# Patient Record
Sex: FEMALE | Race: AMERICAN INDIAN OR ALASKA NATIVE
[De-identification: names, ages, dates, MRNs, and addresses within clinical notes are randomized per-mention and may not be internally consistent; named-entity substitution may affect disease eponyms.]

---

## 2021-04-20 ENCOUNTER — HOSPITAL ENCOUNTER (EMERGENCY)
Dept: HOSPITAL 5 - ED | Age: 36
LOS: 2 days | Discharge: HOME | End: 2021-04-22
Payer: SELF-PAY

## 2021-04-20 DIAGNOSIS — Z79.899: ICD-10-CM

## 2021-04-20 DIAGNOSIS — Z20.822: ICD-10-CM

## 2021-04-20 DIAGNOSIS — F17.200: ICD-10-CM

## 2021-04-20 DIAGNOSIS — E11.9: ICD-10-CM

## 2021-04-20 DIAGNOSIS — F31.9: Primary | ICD-10-CM

## 2021-04-20 PROCEDURE — 36415 COLL VENOUS BLD VENIPUNCTURE: CPT

## 2021-04-20 PROCEDURE — 80048 BASIC METABOLIC PNL TOTAL CA: CPT

## 2021-04-20 PROCEDURE — 80320 DRUG SCREEN QUANTALCOHOLS: CPT

## 2021-04-20 PROCEDURE — 81001 URINALYSIS AUTO W/SCOPE: CPT

## 2021-04-20 PROCEDURE — 96376 TX/PRO/DX INJ SAME DRUG ADON: CPT

## 2021-04-20 PROCEDURE — 99284 EMERGENCY DEPT VISIT MOD MDM: CPT

## 2021-04-20 PROCEDURE — 82962 GLUCOSE BLOOD TEST: CPT

## 2021-04-20 PROCEDURE — 96374 THER/PROPH/DIAG INJ IV PUSH: CPT

## 2021-04-20 PROCEDURE — 80307 DRUG TEST PRSMV CHEM ANLYZR: CPT

## 2021-04-20 PROCEDURE — U0003 INFECTIOUS AGENT DETECTION BY NUCLEIC ACID (DNA OR RNA); SEVERE ACUTE RESPIRATORY SYNDROME CORONAVIRUS 2 (SARS-COV-2) (CORONAVIRUS DISEASE [COVID-19]), AMPLIFIED PROBE TECHNIQUE, MAKING USE OF HIGH THROUGHPUT TECHNOLOGIES AS DESCRIBED BY CMS-2020-01-R: HCPCS

## 2021-04-20 PROCEDURE — G0480 DRUG TEST DEF 1-7 CLASSES: HCPCS

## 2021-04-20 PROCEDURE — 85025 COMPLETE CBC W/AUTO DIFF WBC: CPT

## 2021-04-20 PROCEDURE — 96361 HYDRATE IV INFUSION ADD-ON: CPT

## 2021-04-21 VITALS — DIASTOLIC BLOOD PRESSURE: 81 MMHG | SYSTOLIC BLOOD PRESSURE: 123 MMHG

## 2021-04-21 LAB
BASOPHILS # (AUTO): 0 K/MM3 (ref 0–0.1)
BASOPHILS NFR BLD AUTO: 0.6 % (ref 0–1.8)
BILIRUB UR QL STRIP: (no result)
BLOOD UR QL VISUAL: (no result)
BUN SERPL-MCNC: 12 MG/DL (ref 7–17)
BUN/CREAT SERPL: 15 %
CALCIUM SERPL-MCNC: 9.6 MG/DL (ref 8.4–10.2)
EOSINOPHIL # BLD AUTO: 0 K/MM3 (ref 0–0.4)
EOSINOPHIL NFR BLD AUTO: 0.1 % (ref 0–4.3)
HCT VFR BLD CALC: 41.4 % (ref 30.3–42.9)
HEMOLYSIS INDEX: 8
HGB BLD-MCNC: 14.1 GM/DL (ref 10.1–14.3)
LYMPHOCYTES # BLD AUTO: 1.3 K/MM3 (ref 1.2–5.4)
LYMPHOCYTES NFR BLD AUTO: 18.7 % (ref 13.4–35)
MCHC RBC AUTO-ENTMCNC: 34 % (ref 30–34)
MCV RBC AUTO: 90 FL (ref 79–97)
MONOCYTES # (AUTO): 0.3 K/MM3 (ref 0–0.8)
MONOCYTES % (AUTO): 4.7 % (ref 0–7.3)
MUCOUS THREADS #/AREA URNS HPF: (no result) /HPF
PH UR STRIP: 5 [PH] (ref 5–7)
PLATELET # BLD: 220 K/MM3 (ref 140–440)
PROT UR STRIP-MCNC: (no result) MG/DL
RBC # BLD AUTO: 4.59 M/MM3 (ref 3.65–5.03)
RBC #/AREA URNS HPF: 1 /HPF (ref 0–6)
UROBILINOGEN UR-MCNC: < 2 MG/DL (ref ?–2)
WBC #/AREA URNS HPF: 5 /HPF (ref 0–6)

## 2021-04-21 RX ADMIN — ACETAMINOPHEN ONE: 500 TABLET ORAL at 19:33

## 2021-04-21 RX ADMIN — ACETAMINOPHEN ONE MG: 500 TABLET ORAL at 16:55

## 2021-04-21 RX ADMIN — VALPROIC ACID SCH MG: 250 CAPSULE ORAL at 15:14

## 2021-04-21 RX ADMIN — VALPROIC ACID SCH: 250 CAPSULE ORAL at 22:12

## 2021-04-21 RX ADMIN — HALOPERIDOL SCH MG: 2 TABLET ORAL at 15:15

## 2021-04-21 RX ADMIN — HALOPERIDOL SCH: 2 TABLET ORAL at 22:12

## 2021-04-21 RX ADMIN — ACETAMINOPHEN ONE: 500 TABLET ORAL at 15:20

## 2021-04-21 RX ADMIN — INSULIN LISPRO SCH: 100 INJECTION, SOLUTION INTRAVENOUS; SUBCUTANEOUS at 19:34

## 2021-04-21 RX ADMIN — INSULIN LISPRO SCH: 100 INJECTION, SOLUTION INTRAVENOUS; SUBCUTANEOUS at 16:56

## 2021-04-21 NOTE — EVENT NOTE
S: "I have a little bit of a headache.  May I have some Tylenol?"





O: NAD, stable vital signs, recent 





A:  SI, IDDM, tension HA





P: order home insulin regimen consider  SSI, awaiting MH consultation

## 2021-04-21 NOTE — EMERGENCY DEPARTMENT REPORT
HPI





- HPI


HPI: 





This is a 35-year-old -American female who presents to the emergency 

department with a complaint of depression and suicidal ideations, with a plan to

stab herself.  Patient says that she went to the store to buy cigarettes and 

soda and became severely depressed when she did not have the money to buy these 

items.  She says that she has been without any money over the past 3 weeks.  She

has a history of bipolar disorder for which she says she is compliant with 

Haldol and Depakote.  She denies any current hallucinations or any homicidal 

ideations.  She has a past medical history of insulin-dependent diabetes.  She 

denies any fever, headache, chest pain, shortness of breath, lower extremity s

welling, nausea, vomiting.  She denies any current alcohol intoxication or any 

illicit drug use.





<ELENA LEMON - Last Filed: 04/21/21 15:41>





<DAISY CORTÉS - Last Filed: 04/22/21 11:46>





- General


Chief Complaint: Psych


Time Seen by Provider: 04/21/21 06:02





ED Past Medical Hx





- Past Medical History


Hx Diabetes: Yes


Hx Psychiatric Treatment: Yes





- Surgical History


Past Surgical History?: No





- Social History


Smoking Status: Current Every Day Smoker


Substance Use Type: None





<ELENA LEMON - Last Filed: 04/21/21 15:41>





ED Review of Systems


ROS: 


Stated complaint: MENTAL HEALTH


Other details as noted in HPI





Comment: All other systems reviewed and negative


Constitutional: denies: chills, fever


Eyes: denies: eye pain, vision change


Respiratory: denies: cough, shortness of breath


Cardiovascular: denies: chest pain, palpitations


Gastrointestinal: denies: abdominal pain, vomiting


Musculoskeletal: denies: back pain, arthralgia


Skin: denies: rash, lesions


Neurological: denies: headache, weakness


Psychiatric: depression, suicidal thoughts.  denies: auditory hallucinations, 

visual hallucinations, homicidal thoughts





<ELENA LEMON - Last Filed: 04/21/21 15:41>


ROS: 


Stated complaint: MENTAL HEALTH


Other details as noted in HPI








<DAISY CORTÉS - Last Filed: 04/22/21 11:46>





Physical Exam





- Physical Exam


Vital Signs: 


                                   Vital Signs











  04/20/21 04/21/21





  18:55 06:00


 


Temperature 98.3 F 98.2 F


 


Pulse Rate  81


 


Respiratory 20 18





Rate  


 


Blood Pressure 125/90 


 


Blood Pressure  123/81





[Left]  


 


O2 Sat by Pulse  100





Oximetry  











Physical Exam: 





GENERAL: The patient is well-developed well-nourished.


HENT: Normocephalic.  Atraumatic.    Patient has moist mucous membranes.


EYES: Extraocular motions are intact.  


NECK: Supple. Trachea is midline.


CHEST/LUNGS: Clear to auscultation.  There is no respiratory distress noted.


HEART/CARDIOVASCULAR: Regular.  There is no tachycardia.  There is no murmur.


ABDOMEN: Abdomen is soft, nontender.  Patient has normal bowel sounds.  There is

 no abdominal distention.


SKIN: Skin is warm and dry. 


NEURO: The patient is awake, alert, and cooperative.  The patient has no focal 

neurologic deficits.  Normal speech.


MUSCULOSKELETAL: There is no tenderness or deformity.  There is no limitation 

range of motion. 


PSYCH: Patient has a flat affect.





<ELENA LEMON - Last Filed: 04/21/21 15:41>





- Physical Exam


Vital Signs: 


                                   Vital Signs











  04/20/21 04/21/21 04/21/21





  18:55 06:00 23:08


 


Temperature 98.3 F 98.2 F 


 


Pulse Rate  81 


 


Respiratory 20 18 18





Rate   


 


Blood Pressure 125/90  


 


Blood Pressure  123/81 





[Left]   


 


O2 Sat by Pulse  100 





Oximetry   














<DAISY CORTÉS - Last Filed: 04/22/21 11:46>





ED Course


                                   Vital Signs











  04/20/21 04/21/21





  18:55 06:00


 


Temperature 98.3 F 98.2 F


 


Pulse Rate  81


 


Respiratory 20 18





Rate  


 


Blood Pressure 125/90 


 


Blood Pressure  123/81





[Left]  


 


O2 Sat by Pulse  100





Oximetry  














<ELENA LEMON - Last Filed: 04/21/21 15:41>


                                   Vital Signs











  04/20/21 04/21/21 04/21/21





  18:55 06:00 23:08


 


Temperature 98.3 F 98.2 F 


 


Pulse Rate  81 


 


Respiratory 20 18 18





Rate   


 


Blood Pressure 125/90  


 


Blood Pressure  123/81 





[Left]   


 


O2 Sat by Pulse  100 





Oximetry   














<DAISY CORTÉS - Last Filed: 04/22/21 11:46>





ED Medical Decision Making





- Lab Data


Result diagrams: 


                                 04/21/21 06:09





                                 04/21/21 06:09





                                   Lab Results











  04/21/21 04/21/21 04/21/21 Range/Units





  06:09 06:09 06:09 


 


WBC  7.1    (4.5-11.0)  K/mm3


 


RBC  4.59    (3.65-5.03)  M/mm3


 


Hgb  14.1    (10.1-14.3)  gm/dl


 


Hct  41.4    (30.3-42.9)  %


 


MCV  90    (79-97)  fl


 


MCH  31    (28-32)  pg


 


MCHC  34    (30-34)  %


 


RDW  12.9 L    (13.2-15.2)  %


 


Plt Count  220    (140-440)  K/mm3


 


Lymph % (Auto)  18.7    (13.4-35.0)  %


 


Mono % (Auto)  4.7    (0.0-7.3)  %


 


Eos % (Auto)  0.1    (0.0-4.3)  %


 


Baso % (Auto)  0.6    (0.0-1.8)  %


 


Lymph # (Auto)  1.3    (1.2-5.4)  K/mm3


 


Mono # (Auto)  0.3    (0.0-0.8)  K/mm3


 


Eos # (Auto)  0.0    (0.0-0.4)  K/mm3


 


Baso # (Auto)  0.0    (0.0-0.1)  K/mm3


 


Seg Neutrophils %  75.9 H    (40.0-70.0)  %


 


Seg Neutrophils #  5.4    (1.8-7.7)  K/mm3


 


Sodium   132 L   (137-145)  mmol/L


 


Potassium   4.1   (3.6-5.0)  mmol/L


 


Chloride   93.7 L   ()  mmol/L


 


Carbon Dioxide   26   (22-30)  mmol/L


 


Anion Gap   16   mmol/L


 


BUN   12   (7-17)  mg/dL


 


Creatinine   0.8   (0.6-1.2)  mg/dL


 


Estimated GFR   > 60   ml/min


 


BUN/Creatinine Ratio   15   %


 


Glucose   446 H   ()  mg/dL


 


POC Glucose     ()  mg/dL


 


Calcium   9.6   (8.4-10.2)  mg/dL


 


Plasma/Serum Alcohol    < 0.01  (0-0.07)  %


 


Coronavirus (PCR)     (Negative)  














  04/21/21 04/21/21 04/21/21 Range/Units





  07:32 08:36 10:44 


 


WBC     (4.5-11.0)  K/mm3


 


RBC     (3.65-5.03)  M/mm3


 


Hgb     (10.1-14.3)  gm/dl


 


Hct     (30.3-42.9)  %


 


MCV     (79-97)  fl


 


MCH     (28-32)  pg


 


MCHC     (30-34)  %


 


RDW     (13.2-15.2)  %


 


Plt Count     (140-440)  K/mm3


 


Lymph % (Auto)     (13.4-35.0)  %


 


Mono % (Auto)     (0.0-7.3)  %


 


Eos % (Auto)     (0.0-4.3)  %


 


Baso % (Auto)     (0.0-1.8)  %


 


Lymph # (Auto)     (1.2-5.4)  K/mm3


 


Mono # (Auto)     (0.0-0.8)  K/mm3


 


Eos # (Auto)     (0.0-0.4)  K/mm3


 


Baso # (Auto)     (0.0-0.1)  K/mm3


 


Seg Neutrophils %     (40.0-70.0)  %


 


Seg Neutrophils #     (1.8-7.7)  K/mm3


 


Sodium     (137-145)  mmol/L


 


Potassium     (3.6-5.0)  mmol/L


 


Chloride     ()  mmol/L


 


Carbon Dioxide     (22-30)  mmol/L


 


Anion Gap     mmol/L


 


BUN     (7-17)  mg/dL


 


Creatinine     (0.6-1.2)  mg/dL


 


Estimated GFR     ml/min


 


BUN/Creatinine Ratio     %


 


Glucose     ()  mg/dL


 


POC Glucose  353 H   342 H  ()  mg/dL


 


Calcium     (8.4-10.2)  mg/dL


 


Plasma/Serum Alcohol     (0-0.07)  %


 


Coronavirus (PCR)   Negative   (Negative)  














  04/21/21 Range/Units





  12:27 


 


WBC   (4.5-11.0)  K/mm3


 


RBC   (3.65-5.03)  M/mm3


 


Hgb   (10.1-14.3)  gm/dl


 


Hct   (30.3-42.9)  %


 


MCV   (79-97)  fl


 


MCH   (28-32)  pg


 


MCHC   (30-34)  %


 


RDW   (13.2-15.2)  %


 


Plt Count   (140-440)  K/mm3


 


Lymph % (Auto)   (13.4-35.0)  %


 


Mono % (Auto)   (0.0-7.3)  %


 


Eos % (Auto)   (0.0-4.3)  %


 


Baso % (Auto)   (0.0-1.8)  %


 


Lymph # (Auto)   (1.2-5.4)  K/mm3


 


Mono # (Auto)   (0.0-0.8)  K/mm3


 


Eos # (Auto)   (0.0-0.4)  K/mm3


 


Baso # (Auto)   (0.0-0.1)  K/mm3


 


Seg Neutrophils %   (40.0-70.0)  %


 


Seg Neutrophils #   (1.8-7.7)  K/mm3


 


Sodium   (137-145)  mmol/L


 


Potassium   (3.6-5.0)  mmol/L


 


Chloride   ()  mmol/L


 


Carbon Dioxide   (22-30)  mmol/L


 


Anion Gap   mmol/L


 


BUN   (7-17)  mg/dL


 


Creatinine   (0.6-1.2)  mg/dL


 


Estimated GFR   ml/min


 


BUN/Creatinine Ratio   %


 


Glucose   ()  mg/dL


 


POC Glucose  214 H  ()  mg/dL


 


Calcium   (8.4-10.2)  mg/dL


 


Plasma/Serum Alcohol   (0-0.07)  %


 


Coronavirus (PCR)   (Negative)  














- Medical Decision Making





This patient presents to the emergency department with complaint of depression 

and suicidal ideations with a plan to stab herself after she was unable to buy 

herself cigarettes and soda.  For this reason the patient has been made a 1013 

and placed on an ED hold.  She was later seen by the psychiatric assessment team

 who agrees with the plan for inpatient stabilization.





The patient's labs showed hyperglycemia with an initial blood sugar of 446 on 

serum blood draw.  On point-of-care testing the blood sugar was 353 prior to 

receiving any IV fluid or insulin.  She was given the fluid and insulin and on 

recheck the blood sugar had only gone down about 10-20 points but it turns out 

that the patient received a breakfast tray around this time.  She was given 

another dose of IV fluid and IV insulin and her blood sugar eventually came down

 to a more reasonable level at about 215.  No signs of diabetic ketoacidosis.  

Blood alcohol level negative.  We are waiting for a urine sample for urinalysis 

and UDS.





Vital signs stable throughout her ED course thus far.





The patient is medically cleared for psychiatric placement.











<ELENA LEMON - Last Filed: 04/21/21 15:41>





- Lab Data


Result diagrams: 


                                 04/21/21 06:09





                                 04/21/21 06:09





- Medical Decision Making





1013 rescinded by mental health team.  Discharged home.





<DAISY CORTÉS - Last Filed: 04/22/21 11:46>


Critical Care Time: No


Critical care attestation.: 


If time is entered above; I have spent that time in minutes in the direct care 

of this critically ill patient, excluding procedure time.








<ELENA LEMON - Last Filed: 04/21/21 15:41>


Critical care attestation.: 


If time is entered above; I have spent that time in minutes in the direct care 

of this critically ill patient, excluding procedure time.








<DAISY CORTÉS - Last Filed: 04/22/21 11:46>





ED Disposition


Is pt being admited?: No


Time of Disposition: 15:45





<ELENA LEMON - Last Filed: 04/21/21 15:41>


Is pt being admited?: No


Does the pt Need Aspirin: No





<DAISY CORTÉS - Last Filed: 04/22/21 11:46>


Clinical Impression: 


 Bipolar 1 disorder, depressed, severe, Hyperglycemia





Disposition: DC-01 TO HOME OR SELFCARE


Condition: Stable

## 2021-04-21 NOTE — CONSULTATION
History of Present Illness





- Reason for Consult


Consult date: 04/21/21


Reason for consult: MHE


Requesting physician: DAISY CORTÉS





- History of Present Psychiatric Illness


Per ED Provider: This is a 35-year-old -American female who presents to 

the emergency department with a complaint of depression and suicidal ideations, 

with a plan to stab herself.  Patient says that she went to the store to buy 

cigarettes and soda and became severely depressed when she did not have the 

money to buy these items.  She says that she has been without any money over the

past 3 weeks.  She has a history of bipolar disorder for which she says she is 

compliant with Haldol and Depakote.  She denies any current hallucinations or 

any homicidal ideations.  She has a past medical history of insulin-dependent 

diabetes.  She denies any fever, headache, chest pain, shortness of breath, 

lower extremity swelling, nausea, vomiting.  She denies any current alcohol 

intoxication or any illicit drug use.





PSYCH HPI


Patient is a 35-year-old, single, currently homeless and unemployed, receiving 

disability income -American female with past psychiatric history of 

bipolar schizophrenia who presented to the ED with chief complaint of depression

and suicidal ideation.  Patient reported suicidal ideation is due to her 

depression which is conditional to not having any money at the moment to buy 

what she wants.  Patient states that she is not able to go out and chill.  

Patient reports she has just recently been released from [facility], and when she 

tried to look for her mom who is her payee, she realized mom had sold the house 

and moved away and she does not have any contact.


SHe says she is depressed because she doesnt have money. She states there is no 

point living likes this without money, hence she would rather die





PAST PSYCHIATRIC HISTORY


Diagnoses: bipolar schizophrenia


Suicide attempts or Self-harm behavior: yes


Prior psychiatric hospitalizations: yes


Substance Abuse history: alcohol and marijuana


Previous psychiatric medications tried: unknown


Outpatient treatment: none reported





PAST MEDICAL HISTORY: none reported





Family Psychiatric History: None reported or documented





SOCIAL HISTORY


Marital Status: single


Living Arrangements: homeless


Employment Status: none


Access to guns/weapons: none


Education: high school


History of Abuse: none reported


Legal History: yes





REVIEW OF SYSTEMS


Constitutional: Negative for weight loss


ENT: Negative for stridor


Respiratory: Negative for cough or hemoptysis


All other systems reviewed and are negative


 


MENTAL STATUS EXAMINATION


General Appearance and Behavior: Age appropriate, good hygiene, wearing 

appropriate clothes,, good eye contact


Cooperation: Participating/engaged, but Guarded


Psychomotor Behavior: Psychomotor normal


Mood: depressed


Affect and affective range:  irritable, labile


Thought Process: illogical


Thought Content:  hopelessness, helplessness


Speech: Normal rate, volume and rythm


Intellectual Functioning: Average


Suicidal Ideation: SI


Homicidal Ideation: Denies HI


Impulse Control: Impaired


Insight and Judgment: Limited insight and judgment


Memory: Normal


Attention:  Normal


Orientation: Alert, oriented





 Assessment and Plan 





- Psychiatric problem


(1) Bipolar 1 disorder


F31.9


Current Visit: Yes   Status: Acute   








Treatment Plan


Patient needs acute medication stabilization for her bipolar manic episode. 


MEDICATIONS: 


Risks, benefits and alternatives of medications discussed with the patient, 

questions answered and consent obtained from patient.


PSYCHOTHERAPY: Supportive psychotherapy provided


MEDICAL: Per primary team


DELIRIUM PRECAUTIONS: Please re-orient patient frequently, keep lights on during

the day, and minimize benzodiazepines and opiates as these medications could 

worsen patient's confusion.


SAFETY SITTER:


DISPOSITION:  Do  Recommend acute inpatient psychiatric hospitalization at this 

time. Case discussed with Dr. Day who agrees with current disposition


LEGAL STATUS:  1013


FOLLOW-UP: Will follow


Thank you for the consult.  Please contact with any questions and/or concerns.


   





Medications and Allergies


                                    Allergies











Allergy/AdvReac Type Severity Reaction Status Date / Time


 


No Known Allergies Allergy   Verified 04/20/21 18:59











Active Meds: 


Active Medications





Dextrose (Dextrose 50% In Water (25gm) 50 Ml Syringe)  50 ml IV Q30MIN PRN; 

Protocol


   PRN Reason: Hypoglycemia


Insulin Glargine (Insulin Glargine 100 Units/Ml)  14 units SUB-Q QHS TAD


Insulin Human Lispro (Insulin Lispro 100 Unit/Ml)  10 unit SUB-Q [facility]











Mental Status Exam





- Vital signs


                                Last Vital Signs











Temp  98.2 F   04/21/21 06:00


 


Pulse  81   04/21/21 06:00


 


Resp  18   04/21/21 06:00


 


BP  123/81   04/21/21 06:00


 


Pulse Ox  100   04/21/21 06:00














Results


Result Diagrams: 


                                 04/21/21 06:09





                                 04/21/21 06:09


                              Abnormal lab results











  04/21/21 04/21/21 04/21/21 Range/Units





  06:09 06:09 07:32 


 


RDW  12.9 L    (13.2-15.2)  %


 


Seg Neutrophils %  75.9 H    (40.0-70.0)  %


 


Sodium   132 L   (137-145)  mmol/L


 


Chloride   93.7 L   ()  mmol/L


 


Glucose   446 H   ()  mg/dL


 


POC Glucose    353 H  ()  mg/dL








All other labs normal.








Assessment and Plan





- Psychiatric problem


(1) Bipolar 1 disorder, depressed, severe


Current Visit: Yes   Status: Acute

## 2021-04-22 RX ADMIN — VALPROIC ACID SCH: 250 CAPSULE ORAL at 09:42

## 2021-04-22 RX ADMIN — INSULIN LISPRO SCH: 100 INJECTION, SOLUTION INTRAVENOUS; SUBCUTANEOUS at 08:00

## 2021-04-22 RX ADMIN — HALOPERIDOL SCH: 2 TABLET ORAL at 09:42

## 2021-04-22 NOTE — EVENT NOTE
S:  "I'm better.  I need transportation home."





O: Insightful, calm, denies suicidal ideation denies homicidal ideation, denies 

hallucinations





A: Bipolar disorder, insulin-dependent diabetes last blood sugar taken on 

yesterday 214, patient has refused Accu-Cheks





Plan: Mental health team has rescinded 1013.  Patient does not meet criteria for

inpatient stabilization, I agree that patient is appropriate and safe for 

discharge

## 2021-04-22 NOTE — PROGRESS NOTE
Subjective





- Reason for Consult


Consult date: 04/22/21


Reason for consult: MHE


Requesting physician: ELENA LEMON





- Chief Complaint


Chief complaint: 





Psych Progress


Patient endorses feeling good today, denies SI, HI or AVH. States she no longer 

feels depressed or angry. Patient also states she does not want any meds rx, 

will go home and continue her own meds. 





REVIEW OF SYSTEMS


Constitutional: Negative for weight loss


ENT: Negative for stridor


Respiratory: Negative for cough or hemoptysis


All other systems reviewed and are negative


 


MENTAL STATUS EXAMINATION


General Appearance and Behavior: Age appropriate, good hygiene, wearing 

appropriate clothes, good eye contact, cooperative polite with questioning.


Cooperation: Participating/engaged


Psychomotor Behavior:  unremarkable and within normal limits


Mood: Good


Affect and affective range: congruent with mood


Thought Process: Fluent/Logical, 


Thought Content: Within reality,


Speech: Normal volume, Regular rate and rhythm, 


Intellectual Functioning: Average


Suicidal Ideation: Denies SI


Homicidal Ideation: Denies HI


Impulse Control: Unimpaired


Insight and Judgment: Normal insight and judgment,


Memory: Normal, 


Attention: Normal,


Orientation: Alert, oriented,





 Assessment and Plan 





- Psychiatric problem


(1) Bipolar 1 disorder


F31.9


Current Visit: Yes   Status: Acute   








Treatment Plan


Patient needs acute medication stabilization for her bipolar manic episode. 


MEDICATIONS: 


Risks, benefits and alternatives of medications discussed with the patient, 

questions answered and consent obtained from patient.


PSYCHOTHERAPY: Supportive psychotherapy provided


MEDICAL: Per primary team


DELIRIUM PRECAUTIONS: Please re-orient patient frequently, keep lights on during

the day, and minimize benzodiazepines and opiates as these medications could 

worsen patient's confusion.


SAFETY SITTER:


DISPOSITION:  Do not  Recommend acute inpatient psychiatric hospitalization at 

this time. Case discussed with Dr. Day who agrees with current disposition


LEGAL STATUS:  1013 rescinded


FOLLOW-UP: Will sign off


Thank you for the consult.  Please contact with any questions and/or concerns.





Mental Status Exam





- Vital signs


                                Last Vital Signs











Temp  98.2 F   04/21/21 06:00


 


Pulse  81   04/21/21 06:00


 


Resp  18   04/21/21 23:08


 


BP  123/81   04/21/21 06:00


 


Pulse Ox  100   04/21/21 06:00














Assessment and Plan





- Patient Problems


(1) Bipolar 1 disorder, depressed, severe


Current Visit: Yes   Status: Acute